# Patient Record
Sex: FEMALE | Race: WHITE | NOT HISPANIC OR LATINO | Employment: FULL TIME | URBAN - METROPOLITAN AREA
[De-identification: names, ages, dates, MRNs, and addresses within clinical notes are randomized per-mention and may not be internally consistent; named-entity substitution may affect disease eponyms.]

---

## 2023-01-04 PROBLEM — F33.41 RECURRENT MAJOR DEPRESSIVE DISORDER, IN PARTIAL REMISSION (HCC): Status: ACTIVE | Noted: 2023-01-04

## 2023-01-04 PROBLEM — G47.00 INSOMNIA: Status: ACTIVE | Noted: 2023-01-04

## 2023-05-31 PROBLEM — M23.8X2 ACL LAXITY, LEFT: Status: ACTIVE | Noted: 2023-05-31

## 2023-06-01 ASSESSMENT — FIBROSIS 4 INDEX: FIB4 SCORE: 0.78

## 2023-06-30 ENCOUNTER — APPOINTMENT (OUTPATIENT)
Dept: ADMISSIONS | Facility: MEDICAL CENTER | Age: 38
End: 2023-06-30
Attending: ORTHOPAEDIC SURGERY
Payer: COMMERCIAL

## 2023-08-15 ENCOUNTER — PRE-ADMISSION TESTING (OUTPATIENT)
Dept: ADMISSIONS | Facility: MEDICAL CENTER | Age: 38
End: 2023-08-15
Attending: ORTHOPAEDIC SURGERY
Payer: COMMERCIAL

## 2023-08-15 VITALS — HEIGHT: 65 IN | BODY MASS INDEX: 52.42 KG/M2

## 2023-08-15 RX ORDER — MULTIVIT WITH MINERALS/LUTEIN
TABLET ORAL DAILY
COMMUNITY

## 2023-08-15 NOTE — PREPROCEDURE INSTRUCTIONS
Pre admit apt: Pt. Instructed to continue regularly prescribed medications through day before surgery.  Instructed to take the following medications, the day of surgery, with a sip of water per anesthesia protocol: sertralilne  METS greater than 4  No issues with anesthesia  Instructed pt to notify Dr. Robles, if she develops any new sxs of covid/illness prior to surgery.

## 2023-08-30 ENCOUNTER — PRE-ADMISSION TESTING (OUTPATIENT)
Dept: ADMISSIONS | Facility: MEDICAL CENTER | Age: 38
End: 2023-08-30
Attending: ORTHOPAEDIC SURGERY
Payer: COMMERCIAL

## 2023-08-30 ENCOUNTER — ANESTHESIA EVENT (OUTPATIENT)
Dept: SURGERY | Facility: MEDICAL CENTER | Age: 38
End: 2023-08-30
Payer: COMMERCIAL

## 2023-08-30 DIAGNOSIS — Z01.812 PRE-OPERATIVE LABORATORY EXAMINATION: ICD-10-CM

## 2023-08-30 LAB — HCG SERPL QL: NEGATIVE

## 2023-08-30 PROCEDURE — 36415 COLL VENOUS BLD VENIPUNCTURE: CPT

## 2023-08-30 PROCEDURE — 84703 CHORIONIC GONADOTROPIN ASSAY: CPT

## 2023-08-31 ENCOUNTER — HOSPITAL ENCOUNTER (OUTPATIENT)
Facility: MEDICAL CENTER | Age: 38
End: 2023-08-31
Attending: ORTHOPAEDIC SURGERY | Admitting: ORTHOPAEDIC SURGERY
Payer: COMMERCIAL

## 2023-08-31 ENCOUNTER — APPOINTMENT (OUTPATIENT)
Dept: RADIOLOGY | Facility: MEDICAL CENTER | Age: 38
End: 2023-08-31
Attending: ORTHOPAEDIC SURGERY
Payer: COMMERCIAL

## 2023-08-31 ENCOUNTER — ANESTHESIA (OUTPATIENT)
Dept: SURGERY | Facility: MEDICAL CENTER | Age: 38
End: 2023-08-31
Payer: COMMERCIAL

## 2023-08-31 VITALS
RESPIRATION RATE: 16 BRPM | HEIGHT: 65 IN | SYSTOLIC BLOOD PRESSURE: 133 MMHG | TEMPERATURE: 97 F | WEIGHT: 293 LBS | DIASTOLIC BLOOD PRESSURE: 71 MMHG | HEART RATE: 91 BPM | OXYGEN SATURATION: 96 % | BODY MASS INDEX: 48.82 KG/M2

## 2023-08-31 PROCEDURE — 700111 HCHG RX REV CODE 636 W/ 250 OVERRIDE (IP): Mod: JZ | Performed by: STUDENT IN AN ORGANIZED HEALTH CARE EDUCATION/TRAINING PROGRAM

## 2023-08-31 PROCEDURE — C1713 ANCHOR/SCREW BN/BN,TIS/BN: HCPCS | Performed by: ORTHOPAEDIC SURGERY

## 2023-08-31 PROCEDURE — 64447 NJX AA&/STRD FEMORAL NRV IMG: CPT | Performed by: ORTHOPAEDIC SURGERY

## 2023-08-31 PROCEDURE — 160029 HCHG SURGERY MINUTES - 1ST 30 MINS LEVEL 4: Performed by: ORTHOPAEDIC SURGERY

## 2023-08-31 PROCEDURE — 700111 HCHG RX REV CODE 636 W/ 250 OVERRIDE (IP): Mod: JZ | Performed by: ORTHOPAEDIC SURGERY

## 2023-08-31 PROCEDURE — 160035 HCHG PACU - 1ST 60 MINS PHASE I: Performed by: ORTHOPAEDIC SURGERY

## 2023-08-31 PROCEDURE — 29888 ARTHRS AID ACL RPR/AGMNTJ: CPT | Mod: LT | Performed by: ORTHOPAEDIC SURGERY

## 2023-08-31 PROCEDURE — 700105 HCHG RX REV CODE 258: Performed by: ORTHOPAEDIC SURGERY

## 2023-08-31 PROCEDURE — 160009 HCHG ANES TIME/MIN: Performed by: ORTHOPAEDIC SURGERY

## 2023-08-31 PROCEDURE — 160041 HCHG SURGERY MINUTES - EA ADDL 1 MIN LEVEL 4: Performed by: ORTHOPAEDIC SURGERY

## 2023-08-31 PROCEDURE — 160048 HCHG OR STATISTICAL LEVEL 1-5: Performed by: ORTHOPAEDIC SURGERY

## 2023-08-31 PROCEDURE — 160025 RECOVERY II MINUTES (STATS): Performed by: ORTHOPAEDIC SURGERY

## 2023-08-31 PROCEDURE — 700102 HCHG RX REV CODE 250 W/ 637 OVERRIDE(OP): Performed by: STUDENT IN AN ORGANIZED HEALTH CARE EDUCATION/TRAINING PROGRAM

## 2023-08-31 PROCEDURE — C1762 CONN TISS, HUMAN(INC FASCIA): HCPCS | Performed by: ORTHOPAEDIC SURGERY

## 2023-08-31 PROCEDURE — 700111 HCHG RX REV CODE 636 W/ 250 OVERRIDE (IP): Performed by: STUDENT IN AN ORGANIZED HEALTH CARE EDUCATION/TRAINING PROGRAM

## 2023-08-31 PROCEDURE — 700111 HCHG RX REV CODE 636 W/ 250 OVERRIDE (IP): Performed by: ORTHOPAEDIC SURGERY

## 2023-08-31 PROCEDURE — 700101 HCHG RX REV CODE 250: Performed by: ORTHOPAEDIC SURGERY

## 2023-08-31 PROCEDURE — 700101 HCHG RX REV CODE 250: Performed by: STUDENT IN AN ORGANIZED HEALTH CARE EDUCATION/TRAINING PROGRAM

## 2023-08-31 PROCEDURE — 160002 HCHG RECOVERY MINUTES (STAT): Performed by: ORTHOPAEDIC SURGERY

## 2023-08-31 PROCEDURE — 29875 ARTHRS KNEE SURG SYNVCT LMTD: CPT | Mod: ASROC | Performed by: PHYSICIAN ASSISTANT

## 2023-08-31 PROCEDURE — 29888 ARTHRS AID ACL RPR/AGMNTJ: CPT | Mod: ASROC,LT | Performed by: PHYSICIAN ASSISTANT

## 2023-08-31 PROCEDURE — 160036 HCHG PACU - EA ADDL 30 MINS PHASE I: Performed by: ORTHOPAEDIC SURGERY

## 2023-08-31 PROCEDURE — A9270 NON-COVERED ITEM OR SERVICE: HCPCS | Performed by: STUDENT IN AN ORGANIZED HEALTH CARE EDUCATION/TRAINING PROGRAM

## 2023-08-31 PROCEDURE — 160046 HCHG PACU - 1ST 60 MINS PHASE II: Performed by: ORTHOPAEDIC SURGERY

## 2023-08-31 PROCEDURE — 29875 ARTHRS KNEE SURG SYNVCT LMTD: CPT | Performed by: ORTHOPAEDIC SURGERY

## 2023-08-31 DEVICE — ANCHOR SUTURE OMEGA PEEK OD3.9 MM 1 ARM KNOTLESS STERILE LATEX FREE DISPOSABLE: Type: IMPLANTABLE DEVICE | Site: KNEE | Status: FUNCTIONAL

## 2023-08-31 DEVICE — PROCINCH RT: Type: IMPLANTABLE DEVICE | Site: KNEE | Status: FUNCTIONAL

## 2023-08-31 DEVICE — SCREW INTERFERENCE BIOSTEON WEDGE 9 X 28CM: Type: IMPLANTABLE DEVICE | Site: KNEE | Status: FUNCTIONAL

## 2023-08-31 DEVICE — GRAFT SOFT TISSUE  PERONEUS LONGUS TENDON 23CM: Type: IMPLANTABLE DEVICE | Site: KNEE | Status: FUNCTIONAL

## 2023-08-31 RX ORDER — DIPHENHYDRAMINE HYDROCHLORIDE 50 MG/ML
12.5 INJECTION INTRAMUSCULAR; INTRAVENOUS
Status: DISCONTINUED | OUTPATIENT
Start: 2023-08-31 | End: 2023-08-31 | Stop reason: HOSPADM

## 2023-08-31 RX ORDER — MAGNESIUM HYDROXIDE 1200 MG/15ML
LIQUID ORAL
Status: COMPLETED | OUTPATIENT
Start: 2023-08-31 | End: 2023-08-31

## 2023-08-31 RX ORDER — ROCURONIUM BROMIDE 10 MG/ML
INJECTION, SOLUTION INTRAVENOUS PRN
Status: DISCONTINUED | OUTPATIENT
Start: 2023-08-31 | End: 2023-08-31 | Stop reason: SURG

## 2023-08-31 RX ORDER — LIDOCAINE HYDROCHLORIDE 20 MG/ML
INJECTION, SOLUTION EPIDURAL; INFILTRATION; INTRACAUDAL; PERINEURAL PRN
Status: DISCONTINUED | OUTPATIENT
Start: 2023-08-31 | End: 2023-08-31 | Stop reason: SURG

## 2023-08-31 RX ORDER — HALOPERIDOL 5 MG/ML
1 INJECTION INTRAMUSCULAR
Status: DISCONTINUED | OUTPATIENT
Start: 2023-08-31 | End: 2023-08-31 | Stop reason: HOSPADM

## 2023-08-31 RX ORDER — SODIUM CHLORIDE, SODIUM LACTATE, POTASSIUM CHLORIDE, CALCIUM CHLORIDE 600; 310; 30; 20 MG/100ML; MG/100ML; MG/100ML; MG/100ML
INJECTION, SOLUTION INTRAVENOUS CONTINUOUS
Status: DISCONTINUED | OUTPATIENT
Start: 2023-08-31 | End: 2023-08-31 | Stop reason: HOSPADM

## 2023-08-31 RX ORDER — HYDROMORPHONE HYDROCHLORIDE 1 MG/ML
0.1 INJECTION, SOLUTION INTRAMUSCULAR; INTRAVENOUS; SUBCUTANEOUS
Status: DISCONTINUED | OUTPATIENT
Start: 2023-08-31 | End: 2023-08-31 | Stop reason: HOSPADM

## 2023-08-31 RX ORDER — MAGNESIUM SULFATE HEPTAHYDRATE 40 MG/ML
INJECTION, SOLUTION INTRAVENOUS PRN
Status: DISCONTINUED | OUTPATIENT
Start: 2023-08-31 | End: 2023-08-31 | Stop reason: SURG

## 2023-08-31 RX ORDER — SCOLOPAMINE TRANSDERMAL SYSTEM 1 MG/1
1 PATCH, EXTENDED RELEASE TRANSDERMAL ONCE
Status: DISCONTINUED | OUTPATIENT
Start: 2023-08-31 | End: 2023-08-31 | Stop reason: HOSPADM

## 2023-08-31 RX ORDER — OXYCODONE HCL 10 MG/1
10 TABLET, FILM COATED, EXTENDED RELEASE ORAL ONCE
Status: COMPLETED | OUTPATIENT
Start: 2023-08-31 | End: 2023-08-31

## 2023-08-31 RX ORDER — EPHEDRINE SULFATE 50 MG/ML
5 INJECTION, SOLUTION INTRAVENOUS
Status: DISCONTINUED | OUTPATIENT
Start: 2023-08-31 | End: 2023-08-31 | Stop reason: HOSPADM

## 2023-08-31 RX ORDER — BUPIVACAINE HYDROCHLORIDE 5 MG/ML
INJECTION, SOLUTION EPIDURAL; INTRACAUDAL
Status: COMPLETED | OUTPATIENT
Start: 2023-08-31 | End: 2023-08-31

## 2023-08-31 RX ORDER — SODIUM CHLORIDE, SODIUM LACTATE, POTASSIUM CHLORIDE, CALCIUM CHLORIDE 600; 310; 30; 20 MG/100ML; MG/100ML; MG/100ML; MG/100ML
INJECTION, SOLUTION INTRAVENOUS CONTINUOUS
Status: ACTIVE | OUTPATIENT
Start: 2023-08-31 | End: 2023-08-31

## 2023-08-31 RX ORDER — MIDAZOLAM HYDROCHLORIDE 1 MG/ML
INJECTION INTRAMUSCULAR; INTRAVENOUS PRN
Status: DISCONTINUED | OUTPATIENT
Start: 2023-08-31 | End: 2023-08-31 | Stop reason: SURG

## 2023-08-31 RX ORDER — ONDANSETRON 2 MG/ML
INJECTION INTRAMUSCULAR; INTRAVENOUS PRN
Status: DISCONTINUED | OUTPATIENT
Start: 2023-08-31 | End: 2023-08-31 | Stop reason: SURG

## 2023-08-31 RX ORDER — LIDOCAINE HYDROCHLORIDE 40 MG/ML
SOLUTION TOPICAL PRN
Status: DISCONTINUED | OUTPATIENT
Start: 2023-08-31 | End: 2023-08-31 | Stop reason: SURG

## 2023-08-31 RX ORDER — HYDROMORPHONE HYDROCHLORIDE 1 MG/ML
0.2 INJECTION, SOLUTION INTRAMUSCULAR; INTRAVENOUS; SUBCUTANEOUS
Status: DISCONTINUED | OUTPATIENT
Start: 2023-08-31 | End: 2023-08-31 | Stop reason: HOSPADM

## 2023-08-31 RX ORDER — ACETAMINOPHEN 500 MG
1000 TABLET ORAL ONCE
Status: COMPLETED | OUTPATIENT
Start: 2023-08-31 | End: 2023-08-31

## 2023-08-31 RX ORDER — ONDANSETRON 2 MG/ML
4 INJECTION INTRAMUSCULAR; INTRAVENOUS
Status: DISCONTINUED | OUTPATIENT
Start: 2023-08-31 | End: 2023-08-31 | Stop reason: HOSPADM

## 2023-08-31 RX ORDER — TRANEXAMIC ACID 100 MG/ML
INJECTION, SOLUTION INTRAVENOUS PRN
Status: DISCONTINUED | OUTPATIENT
Start: 2023-08-31 | End: 2023-08-31 | Stop reason: SURG

## 2023-08-31 RX ORDER — DEXAMETHASONE SODIUM PHOSPHATE 4 MG/ML
INJECTION, SOLUTION INTRA-ARTICULAR; INTRALESIONAL; INTRAMUSCULAR; INTRAVENOUS; SOFT TISSUE PRN
Status: DISCONTINUED | OUTPATIENT
Start: 2023-08-31 | End: 2023-08-31 | Stop reason: SURG

## 2023-08-31 RX ORDER — EPHEDRINE SULFATE 50 MG/ML
INJECTION, SOLUTION INTRAVENOUS PRN
Status: DISCONTINUED | OUTPATIENT
Start: 2023-08-31 | End: 2023-08-31 | Stop reason: SURG

## 2023-08-31 RX ORDER — OXYCODONE HCL 5 MG/5 ML
10 SOLUTION, ORAL ORAL
Status: COMPLETED | OUTPATIENT
Start: 2023-08-31 | End: 2023-08-31

## 2023-08-31 RX ORDER — HYDROMORPHONE HYDROCHLORIDE 1 MG/ML
0.4 INJECTION, SOLUTION INTRAMUSCULAR; INTRAVENOUS; SUBCUTANEOUS
Status: DISCONTINUED | OUTPATIENT
Start: 2023-08-31 | End: 2023-08-31 | Stop reason: HOSPADM

## 2023-08-31 RX ORDER — HYDRALAZINE HYDROCHLORIDE 20 MG/ML
5 INJECTION INTRAMUSCULAR; INTRAVENOUS
Status: DISCONTINUED | OUTPATIENT
Start: 2023-08-31 | End: 2023-08-31 | Stop reason: HOSPADM

## 2023-08-31 RX ORDER — ROPIVACAINE HYDROCHLORIDE 5 MG/ML
INJECTION, SOLUTION EPIDURAL; INFILTRATION; PERINEURAL
Status: DISCONTINUED | OUTPATIENT
Start: 2023-08-31 | End: 2023-08-31 | Stop reason: HOSPADM

## 2023-08-31 RX ORDER — CEFAZOLIN SODIUM 1 G/3ML
3 INJECTION, POWDER, FOR SOLUTION INTRAMUSCULAR; INTRAVENOUS ONCE
Status: COMPLETED | OUTPATIENT
Start: 2023-08-31 | End: 2023-08-31

## 2023-08-31 RX ORDER — OXYCODONE HCL 5 MG/5 ML
5 SOLUTION, ORAL ORAL
Status: COMPLETED | OUTPATIENT
Start: 2023-08-31 | End: 2023-08-31

## 2023-08-31 RX ADMIN — FENTANYL CITRATE 25 MCG: 50 INJECTION, SOLUTION INTRAMUSCULAR; INTRAVENOUS at 11:06

## 2023-08-31 RX ADMIN — FENTANYL CITRATE 50 MCG: 50 INJECTION, SOLUTION INTRAMUSCULAR; INTRAVENOUS at 10:34

## 2023-08-31 RX ADMIN — BUPIVACAINE HYDROCHLORIDE 20 ML: 5 INJECTION, SOLUTION EPIDURAL; INTRACAUDAL at 07:55

## 2023-08-31 RX ADMIN — SCOPOLAMINE 1 PATCH: 1.5 PATCH, EXTENDED RELEASE TRANSDERMAL at 08:14

## 2023-08-31 RX ADMIN — SODIUM CHLORIDE, POTASSIUM CHLORIDE, SODIUM LACTATE AND CALCIUM CHLORIDE: 600; 310; 30; 20 INJECTION, SOLUTION INTRAVENOUS at 07:50

## 2023-08-31 RX ADMIN — EPHEDRINE SULFATE 10 MG: 50 INJECTION, SOLUTION INTRAVENOUS at 09:46

## 2023-08-31 RX ADMIN — FENTANYL CITRATE 100 MCG: 50 INJECTION, SOLUTION INTRAMUSCULAR; INTRAVENOUS at 08:50

## 2023-08-31 RX ADMIN — LIDOCAINE HYDROCHLORIDE 4 ML: 40 SOLUTION TOPICAL at 09:07

## 2023-08-31 RX ADMIN — OXYCODONE HYDROCHLORIDE 5 MG: 5 SOLUTION ORAL at 11:06

## 2023-08-31 RX ADMIN — ONDANSETRON 4 MG: 2 INJECTION INTRAMUSCULAR; INTRAVENOUS at 10:30

## 2023-08-31 RX ADMIN — ROCURONIUM BROMIDE 50 MG: 50 INJECTION, SOLUTION INTRAVENOUS at 09:07

## 2023-08-31 RX ADMIN — ACETAMINOPHEN 1000 MG: 500 TABLET ORAL at 07:49

## 2023-08-31 RX ADMIN — CEFAZOLIN 3 G: 1 INJECTION, POWDER, FOR SOLUTION INTRAMUSCULAR; INTRAVENOUS at 09:06

## 2023-08-31 RX ADMIN — SUGAMMADEX 200 MG: 100 INJECTION, SOLUTION INTRAVENOUS at 10:34

## 2023-08-31 RX ADMIN — MAGNESIUM SULFATE HEPTAHYDRATE 2 G: 2 INJECTION, SOLUTION INTRAVENOUS at 09:24

## 2023-08-31 RX ADMIN — DEXAMETHASONE SODIUM PHOSPHATE 8 MG: 4 INJECTION INTRA-ARTICULAR; INTRALESIONAL; INTRAMUSCULAR; INTRAVENOUS; SOFT TISSUE at 09:10

## 2023-08-31 RX ADMIN — MIDAZOLAM 2 MG: 1 INJECTION, SOLUTION INTRAMUSCULAR; INTRAVENOUS at 07:55

## 2023-08-31 RX ADMIN — LIDOCAINE HYDROCHLORIDE 100 MG: 20 INJECTION, SOLUTION EPIDURAL; INFILTRATION; INTRACAUDAL at 08:50

## 2023-08-31 RX ADMIN — TRANEXAMIC ACID 1000 MG: 100 INJECTION, SOLUTION INTRAVENOUS at 09:36

## 2023-08-31 RX ADMIN — PROPOFOL 200 MG: 10 INJECTION, EMULSION INTRAVENOUS at 09:06

## 2023-08-31 RX ADMIN — OXYCODONE HYDROCHLORIDE 10 MG: 10 TABLET, FILM COATED, EXTENDED RELEASE ORAL at 07:49

## 2023-08-31 ASSESSMENT — PAIN DESCRIPTION - PAIN TYPE
TYPE: SURGICAL PAIN
TYPE: CHRONIC PAIN

## 2023-08-31 ASSESSMENT — FIBROSIS 4 INDEX: FIB4 SCORE: 0.78

## 2023-08-31 ASSESSMENT — PAIN SCALES - GENERAL: PAIN_LEVEL: 3

## 2023-08-31 NOTE — LETTER
June 19, 2023    Patient Name: Moni Elizabeth  Surgeon Name: Neftaly Robles M.D.  Sur[facility] [facility]  Surgery Date: 8/31/2023    The time of your surgery is not final and may change up to and until the day of your surgery. You will be contacted 24-48 hours prior to your surgery date with your check-in and surgery time.    If you will not be at one of the below numbers please call the surgery scheduler at [phone]  Preferred Phone: [phone]    BEFORE YOUR SURGERY   Pre Registration and/or Lab Work must be done within and no earlier than 28 days prior to your surgery date. Please call [facility] at [phone] for an appointment as soon as possible.    Pre op Appointment:   Date: 8/30/2023   Time: 9:00AM   Provider: Neftaly Robles MD   Location: [address] [place] [id number]  Instructions: Bring a list of all medications you are taking including the dosing and frequency.    - Please  your non-narcotic prescriptions prior to surgery at the pharmacy you provided us. DON'T START them until after your surgery.    DAY OF YOUR SURGERY  Nothing to eat or drink after midnight     Refrain from smoking any substance after midnight prior to surgery. Smoking may interfere with the anesthetic and frequently produces nausea during the recovery period.    Continue taking all lifesaving medications. Including the morning of your surgery with small sip of water.    Please do NOT take on the day of surgery:  Diuretics: examples- furosemide (Lasix), spironolactone, hydrochlorothiazide  ACE-inhibitors: examples- lisinopril, ramipril, enalapril  “ARBs”: examples- losartan, Olmesartan, valsartan    Please arrive at the hospital/surgery center at the check-in time provided.     An adult will need to bring you and take you home after your surgery.     AFTER YOUR SURGERY  Post op Appointment:   Date:  9/8/2023   Time: 9:00AM   With: Neftaly Robles MD   Location: [address] [place] [address]    - Therapy- Your first appointment should be 8-10  day(s) after your surgery. For your convenience we have 4 Physical Therapy locations: [place], [facility], [facility], and [facility]. Call our office ASAP to schedule an appointment at [phone] or take the enclosed Therapy Prescription to a facility of your choice.  - You must have someone provide transportation post surgery and someone to monitor you for at least 24 hours post-surgery. If you don't have either of these your appointment will be canceled.     TIME OFF WORK  FMLA or Disability forms can be faxed directly to: [phone] or you may drop them off at [address] [place] [address]. Our office charges a $35.00 fee per form. Forms will be completed within 10 business days of drop off and payment received. For the status of your forms you may contact our disability office directly [phone].    MEDICATION INSTRUCTIONS **Please read section completely**    The following medications should be stopped a minimum of 10 days prior to surgery:  All over the counter, Supplements & Herbal medications    Anorectics: Phentermine (Adipex-P, Lomaira and Suprenza), Phentermine-topiramate (Qsymia), Bupropion-naltrexone (Contrave)    Opiod Partial Agonists/Opioid Antagonists: Buprenorphine (Subocone, Belbuca, Butrans, Probuphine Implant, Sublocade), Naltrexone (ReVia, Vivitrol), Naloxone    Amphetamines: Dextroamphetamine/Amphetamine (Adderall, Mydayis), Methylphenidate Hydrochloride (Concerta, Metadate, Methylin, Ritalin)    The following medications should be stopped 5 days prior to surgery:  Blood Thinners: Any Aspirin, Aspirin products, anti-inflammatories such as ibuprofen and any blood thinners such as Coumadin and Plavix. Please consult your prescribing physician if you are on life saving blood thinners, in regards to when to stop  medications prior to surgery.     The following medications should be stopped a minimum of 3 days prior to surgery:  PDE-5 inhibitors: Sildenafil (Viagra), Tadalafil (Cialis), Vardenafil (Levitra), Avanafil (Stendra)    MAO Inhibitors: Rasagiline (Azilect), Selegiline (Eldepryl, Emsam, Selapar), Isocarboxazid (Marplan), Phenelzine (Nardil)

## 2023-08-31 NOTE — DISCHARGE INSTRUCTIONS
ACTIVITY: Rest and take it easy for the first 24 hours.  A responsible adult is recommended to remain with you during that time.  It is normal to feel sleepy.  We encourage you to not do anything that requires balance, judgment or coordination.    MILD FLU-LIKE SYMPTOMS ARE NORMAL. YOU MAY EXPERIENCE GENERALIZED MUSCLE ACHES, THROAT IRRITATION, HEADACHE AND/OR SOME NAUSEA.    FOR 24 HOURS DO NOT:  Drive, operate machinery or run household appliances.  Drink beer or alcoholic beverages.   Make important decisions or sign legal documents.    DIET: To avoid nausea, slowly advance diet as tolerated, avoiding spicy or greasy foods for the first day.  Add more substantial food to your diet according to your physician's instructions. INCREASE FLUIDS AND FIBER TO AVOID CONSTIPATION.    FOLLOW-UP APPOINTMENT:  A follow-up appointment should be arranged with your doctor; call to schedule.    You should CALL YOUR PHYSICIAN if you develop:  Fever greater than 101 degrees F.  Pain not relieved by medication, or persistent nausea or vomiting.  Excessive bleeding (blood soaking through dressing) or unexpected drainage from the wound.  Extreme redness or swelling around the incision site, drainage of pus or foul smelling drainage.  Inability to urinate or empty your bladder within 8 hours.  Problems with breathing or chest pain.    You should call 911 if you develop problems with breathing or chest pain.  If you are unable to contact your doctor or surgical center, you should go to the nearest emergency room or urgent care center.  Physician's telephone #: [phone]    If any questions arise, call your doctor.  If your doctor is not available, please feel free to call the Surgical Center at [phone].     A registered nurse may call you a few days after your surgery to see how you are doing after your procedure.    MEDICATIONS: Resume taking daily medication.  Take prescribed pain medication with food.  If no medication  is prescribed, you may take non-aspirin pain medication if needed.  PAIN MEDICATION CAN BE VERY CONSTIPATING.  Take a stool softener or laxative such as senokot, pericolace, or milk of magnesia if needed.    Last pain medication given at 11:06 Oxycodone 5mg.    If your physician has prescribed pain medication that includes Acetaminophen (Tylenol), do not take additional Acetaminophen (Tylenol) while taking the prescribed medication.    Peripheral Nerve Block Discharge Instructions from Same Day Surgery and Inpatient :    What to Expect - Lower Extremity  The block may cause you to experience numbness and weakness in your hip and thigh, thigh and knee, or calf and foot on the same side as your surgery  Numbness, tingling and / or weakness are all normal. For some people, this may be an unpleasant sensation  These issues will be resolved when the local anesthetic wears off   You may experience numbness and tingling in your thigh on the same side as your surgery if the block medicine was injected at your groin area  Numbness will make it difficult to walk  You may have problems with balance and walking so be very careful   Follow your surgeon's direction regarding weight bearing on your surgical limb  Be very careful with your numb limb  Precautions  The numbness may affect your balance  Be careful when walking or moving around  Your leg may be weak: be very careful putting weight on it  If your surgeon did not specify a time, you should not bear weight for 24 hours  Be sure to ask for help when you need it  It is better to have help than to fall and hurt yourself  Prevent Injury  Protect the limb like a baby  Beware of exposing your limb to extreme heat or cold or trauma  The limb may be injured without you noticing because it is numb  Keep the limb elevated whenever possible  Do not sleep on the limb  Change the position of the limb regularly  Avoid putting pressure on your surgical limb  Pain Control  The initial  "block on the day of surgery will make your extremity feel \"numb\"  Any consecutive injection including prior to discharge from the hospital will make your extremity feel \"numb\"  You may feel an aching or burning when the local anesthesia starts to wear off  Take pain pills as prescribed by your surgeon  Call your surgeon or anesthesiologist if you do not have adequate pain control    Scopolamine Patches    Okay to remove the patch behind your right ear in 72 hours (Sunday 09/03/2023). Please wash your hands immediately after removing patch and avoid touching your eyes, nose, or mouth.     What is this medication?  SCOPOLAMINE (skoe MARY ANNE a meen) prevents nausea and vomiting. It works by blocking substances in your body that may cause nausea and vomiting. It belongs to a class of medications called antiemetics.  This medicine may be used for other purposes; ask your health care provider or pharmacist if you have questions.  COMMON BRAND NAME(S): Transderm Scop  What should I tell my care team before I take this medication?  They need to know if you have any of these conditions:  Are scheduled to have a gastric secretion test  Glaucoma  Heart disease  Kidney disease  Liver disease  Lung or breathing disease, such as asthma  Mental health condition  Prostate disease  Seizures  Stomach or intestine problems  Trouble passing urine  An unusual or allergic reaction to scopolamine, atropine, other medications, foods, dyes, or preservatives  Pregnant or trying to get pregnant  Breast-feeding  How should I use this medication?  This medication is for external use only. Follow the directions on the prescription label. Wear only 1 patch at a time. Choose an area behind the ear, that is clean, dry, hairless and free from any cuts or irritation. Wipe the area with a clean dry tissue. Peel off the plastic backing of the skin patch, trying not to touch the adhesive side with your hands. Do not cut the patches. Firmly apply to the area " you have chosen, with the metallic side of the patch to the skin and the tan-colored side showing. Once firmly in place, wash your hands well with soap and water. Do not get this medication in your eyes.  After removing the patch, wash your hands and the area behind your ear thoroughly with soap and water. The patch will still contain some medication after use. To avoid accidental contact or ingestion by children or pets, fold the used patch in half with the sticky side together and throw away in the trash out of the reach of children and pets. If you need to use a second patch after you remove the first, place it behind the other ear.  A special MedGuide will be given to you by the pharmacist with each prescription and refill. Be sure to read this information carefully each time.  Talk to your care team about the use of this medication in children. Special care may be needed.  Overdosage: If you think you have taken too much of this medicine contact a poison control center or emergency room at once.  NOTE: This medicine is only for you. Do not share this medicine with others.  What if I miss a dose?  This does not apply. This medication is not for regular use.  What may interact with this medication?  Alcohol  Antihistamines for allergy cough and cold  Atropine  Certain medications for anxiety or sleep  Certain medications for bladder problems, such as oxybutynin, tolterodine  Certain medications for depression, such as amitriptyline, fluoxetine, sertraline  Certain medications for Parkinson disease, such as benztropine, trihexyphenidyl  Certain medications for seizures, such as phenobarbital, primidone  Certain medications for stomach problems, such as dicyclomine, hyoscyamine  General anesthetics, such as halothane, isoflurane, methoxyflurane, propofol  Ipratropium  Local anesthetics, such as lidocaine, pramoxine, tetracaine  Medications that relax muscles for surgery  Opioid medications for pain  Other  belladonna alkaloids  Phenothiazines, such as chlorpromazine, mesoridazine, prochlorperazine, thioridazine  This list may not describe all possible interactions. Give your health care provider a list of all the medicines, herbs, non-prescription drugs, or dietary supplements you use. Also tell them if you smoke, drink alcohol, or use illegal drugs. Some items may interact with your medicine.  What should I watch for while using this medication?  Limit contact with water while swimming and bathing because the patch may fall off. If the patch falls off, throw it away and put a new one behind the other ear.  This medication may affect your coordination, reaction time, or judgment. Do not drive or operate machinery until you know how this medication affects you. Sit up or stand slowly to reduce the risk of dizzy or fainting spells. Drinking alcohol with this medication can increase the risk of these side effects.  Your mouth may get dry. Chewing sugarless gum or sucking hard candy, and drinking plenty of water may help. Contact your care team if the problem does not go away or is severe.  This medication may cause dry eyes and blurred vision. If you wear contact lenses, you may feel some discomfort. Lubricating drops may help. See your care team if the problem does not go away or is severe.  If you are going to need surgery, an MRI, CT scan, or other procedure, tell your care team that you are using this medication. You may need to remove the patch before the procedure.  What side effects may I notice from receiving this medication?  Side effects that you should report to your care team as soon as possible:  Allergic reactions--skin rash, itching, hives, swelling of the face, lips, tongue, or throat  Constipation, bloating, nausea or vomiting, stomach pain, which may be signs of slow movement through the digestive tract  Mood and behavior changes--anxiety, nervousness, confusion, hallucinations, irritability, hostility,  thoughts of suicide or self-harm, worsening mood, feelings of depression  Seizures  Sudden eye pain or change in vision such as blurry vision, seeing halos around lights, vision loss  Trouble passing urine  Side effects that usually do not require medical attention (report to your care team if they continue or are bothersome):  Confusion  Dizziness  Drowsiness  Dry mouth  Sore throat  This list may not describe all possible side effects. Call your doctor for medical advice about side effects. You may report side effects to FDA at 4-842-ZCF-2912.  Where should I keep my medication?  Keep out of the reach of children and pets.  Store at room temperature between 20 and 25 degrees C (68 and 77 degrees F). Keep this medication in the foil package until ready to use.  Get rid of any unused medication after the expiration date.  NOTE: This sheet is a summary. It may not cover all possible information. If you have questions about this medicine, talk to your doctor, pharmacist, or health care provider.  © 2023 [employer]vier/Gold Standard (2022-11-09 00:00:00)

## 2023-08-31 NOTE — ANESTHESIA PROCEDURE NOTES
Arterial Line    Performed by: Esperanza Guillory M.D.  Authorized by: Esperanza Guillory M.D.    Start Time:  8/31/2023 9:02 AM  End Time:  8/31/2023 9:05 AM  Localization: ultrasound guidance  Image captured, interpreted and electronically stored.  Patient Location:  OR  Indication: continuous blood pressure monitoring        Catheter Size:  20 G  Seldinger Technique?: Yes    Laterality:  Left  Site:  Radial artery  Line Secured:  Antimicrobial disc, tape and transparent dressing  Events: patient tolerated procedure well with no complications

## 2023-08-31 NOTE — ANESTHESIA PREPROCEDURE EVALUATION
Case: [id number] Date/Time: 08/31/23 0845    Procedure: LEFT KNEE ARTHROSCOPY, ANTERIOR CRUCIATE LIGAMENT RECONSTRUCTION WITH HAMSTRING AUTOGRAFT, REPAIRS AS INDICATED    Diagnosis: ACL laxity, left [M23.8X2]    Pre-op diagnosis: ACL laxity, left [M23.8X2]    Location:  OR 03 / SURGERY [facility]    Surgeons: Neftaly Robles M.D.        37 yo woman for LEFT knee arthroscopy and ACL reconstruction.   - BMI 55    PONV with prior GA.     NPO>8hrs. No N/V.   METS>4.     No tobacco, EtOH or recreational drugs.     Relevant Problems   Other   (positive) Open injury of spleen       Physical Exam    Airway   Mallampati: II  TM distance: >3 FB  Neck ROM: full       Cardiovascular - normal exam  Rhythm: regular  Rate: normal  (-) murmur     Dental - normal exam           Pulmonary - normal exam  Breath sounds clear to auscultation     Abdominal   (+) obese     Neurological - normal exam         Other findings: No loose teeth             Anesthesia Plan    ASA 3   ASA physical status 3 criteria: morbid obesity - BMI greater than or equal to 40    Plan - general and peripheral nerve block     Peripheral nerve block will be post-op pain control  Airway plan will be ETT          Induction: intravenous    Postoperative Plan: Postoperative administration of opioids is intended.    Pertinent diagnostic labs and testing reviewed    Informed Consent:    Anesthetic plan and risks discussed with patient.    Use of blood products discussed with: patient whom consented to blood products.

## 2023-08-31 NOTE — ANESTHESIA POSTPROCEDURE EVALUATION
Patient: Moni Elizabeth    Procedure Summary     Date: 08/31/23 Room / Location:  OR  / SURGERY [facility]    Anesthesia Start: 0841 Anesthesia Stop: 1050    Procedure: LEFT KNEE ARTHROSCOPY, ANTERIOR CRUCIATE LIGAMENT RECONSTRUCTION WITH HAMSTRING AUTOGRAFT, REPAIRS AS INDICATED (Knee) Diagnosis:       ACL laxity, left      (ACL laxity, left )    Surgeons: Neftaly Robles M.D. Responsible Provider: Esperanza Guillory M.D.    Anesthesia Type: general, peripheral nerve block ASA Status: 3          Final Anesthesia Type: general, peripheral nerve block  Last vitals  BP   Blood Pressure: (!) 153/6    Temp   36.4 °C (97.5 °F)    Pulse   87   Resp   18    SpO2   95 %      Anesthesia Post Evaluation    Patient location during evaluation: PACU  Patient participation: complete - patient participated  Level of consciousness: awake and alert  Pain score: 3    Airway patency: patent  Anesthetic complications: no  Cardiovascular status: hemodynamically stable  Respiratory status: acceptable and face mask  Hydration status: euvolemic    PONV: none          No notable events documented.     Nurse Pain Score: 2 (NPRS)

## 2023-08-31 NOTE — H&P
Surgery Orthopedic History & Physical Note    Date  8/31/2023    Primary Care Physician  TONY Butler      Pre-Op Diagnosis Codes:     * ACL laxity, left [M23.8X2]    HPI  This is a 38 y.o. female who presented with left knee pain and instability.    Past Medical History:   Diagnosis Date    Alcoholism (HCC)     Anorexia nervosa 01/01/2002    inpatient rehab in [place] in 2002    Anxiety     Depression     Psychiatric disorder     depression/anxiety    Spleen laceration        Past Surgical History:   Procedure Laterality Date    TONSILLECTOMY Bilateral 2021       Current Facility-Administered Medications   Medication Dose Route Frequency Provider Last Rate Last Admin    ceFAZolin (Ancef) injection 3 g  3 g Intravenous Once Neftaly Robles M.D.        lidocaine (Xylocaine) 1 % injection 0.5 mL  0.5 mL Intradermal Once PRN Neftaly Robles M.D.        lactated ringers infusion   Intravenous Continuous Neftaly Robles M.D. 10 mL/hr at 08/31/23 0750 New Bag at 08/31/23 0750    scopolamine (Transderm-Scop) patch 1 Patch  1 Patch Transdermal Once Esperanza Guillory M.D.   1 Patch at 08/31/23 0814     Facility-Administered Medications Ordered in Other Encounters   Medication Dose Route Frequency Provider Last Rate Last Admin    midazolam (Versed) injection   Intravenous PRN Espearnza Guillory M.D.   2 mg at 08/31/23 0755       Social History     Socioeconomic History    Marital status:      Spouse name: Not on file    Number of children: Not on file    Years of education: Not on file    Highest education level: Not on file   Occupational History    Not on file   Tobacco Use    Smoking status: Never    Smokeless tobacco: Never   Vaping Use    Vaping Use: Never used   Substance and Sexual Activity    Alcohol use: Not Currently     Comment: sober 6 years    Drug use: Yes     Comment: CBD/THC edibles to sleep, PRN    Sexual activity: Yes     Partners: Male     Birth control/protection: I.U.D.    Other Topics Concern    Not on file   Social History Narrative    Not on file     Social Determinants of Health     Financial Resource Strain: Not on file   Food Insecurity: Not on file   Transportation Needs: Not on file   Physical Activity: Not on file   Stress: Not on file   Social Connections: Not on file   Intimate Partner Violence: Not on file   Housing Stability: Not on file       Family History   Problem Relation Age of Onset    Hypertension Mother     Hyperlipidemia Mother     Alcohol abuse Father     Heart Disease Father     Alcohol abuse Paternal Grandfather        Allergies  Sulfa drugs and Tape    Review of Systems  Negative    Physical Exam  No deformity.  Small effusion.  Range of motion 0 to 120 degrees.  Patient can perform a straight leg raise without an extensor lag.  Some tenderness over the medial and lateral joint lines.  Discomfort but no clicking with Yossi's test.  Laxity with Lachman test.  Negative posterior drawer.  No obvious pain or instability with varus and valgus at 0 and 30 degrees.  Lower leg is soft and nontender.  Neurovascular intact.    Vital Signs  Blood Pressure: (!) 153/6   Temperature: 36.4 °C (97.5 °F)   Pulse: 87   Respiration: 18   Pulse Oximetry: 95 %       Labs:                    Radiology:  No orders to display         Assessment/Plan:  Pre-Op Diagnosis Codes:     * ACL laxity, left [M23.8X2]  Procedure(s):  LEFT KNEE ARTHROSCOPY, ANTERIOR CRUCIATE LIGAMENT RECONSTRUCTION WITH HAMSTRING AUTOGRAFT, REPAIRS AS INDICATED

## 2023-08-31 NOTE — DISCHARGE INSTR - OTHER INFO
Dr. Robles Discharge Instructions  (Knee)    Contact Info: If you have any questions or concerns, please contact [facility] at [phone] during normal business hours (Monday-Friday: 8:00am-5:00pm) or the main office number at [phone] after normal business hours.     Diet: Resume your regular diet slowly and as tolerated.      Icing/Elevating: Apply ice to the operative knee near full time for the first 5 days following surgery.  Be sure to have a surgical dressing or towel between the ice and skin.  After 5 days, you should apply ice for only 10 minutes, 2-3 times per day.  Elevate the operative extremity above the level of your heart (“toes above the nose”), placing pillows or blankets underneath the lower leg/calf or ankle so the knee remains straight/fully extended.  DO NOT place pillows or blankets underneath the operative KNEE.  Placing pillows or blankets underneath the operative knee will cause your knee to be bent/flexed for long periods of time, making it difficult to get the knee straight/fully extended after surgery.    Medications: Resume your home medications as normal, unless otherwise instructed.    Narcotic pain medication (Percocet, Norco, Oxycodone, etc.): Take the narcotic pain medication as needed for pain, as instructed on the medication bottle.  DO NOT drive, drink alcohol, or sign important documents while taking narcotic pain medications.    Anti-nausea medication (Zofran/Ondansetron, Phenergan, etc.): Take the anti-nausea medication if you are feeling ill or nauseated after surgery.    Blood clot (DVT) prophylaxis medication (Aspirin): Take an enteric-coated baby Aspirin (81 mg) twice daily for 2 weeks following surgery to prevent blood clots (deep venous thrombosis/DVT).  If you develop chest pain, shortness of breath, and/or leg pain, swelling, or redness, you should contact Dr. Robles's office immediately.    You should take Prilosec (40 mg daily) OR Pepcid (20 mg  twice daily) when taking the enteric-coated Aspirin to minimize stomach/gastrointestinal irritation (both of these medications can be purchased over-the-counter).      Regional Nerve Blocks: You may have a regional nerve block that was performed by the Anesthesia team prior to surgery to help with postoperative pain control.  The nerve block usually lasts 12-18 hours, but may last up to 24 hours.  You will know the nerve block is wearing off when you begin to have increasing discomfort or soreness around the knee.  Be sure to start taking the prescribed narcotic pain medication within a few hours after surgery and/or as soon as you feel soreness around the operative knee.      Compression Stockings: You may wake up from surgery with compression stockings placed on the operative lower extremity only or on both lower extremities.  These provide compression around the thigh and leg to prevent blood clots and help improve circulation following surgery.  The compression stockings can be removed with the surgical dressing 3 days after surgery, but should then be reapplied.  The compression stockings should be worn while on crutches, while sedentary, or for approximately 2 weeks following surgery depending on restrictions.      Showering/Dressing: You may remove the surgical dressing and compression stocking 3 DAYS AFTER SURGERY.   You will see Steri-Strips (white stickers) covering the sutures and incisions - these will stay on until your first postoperative appointment.  Once the surgical dressing has been removed, you may shower as normal.  Let water run freely over the incisions and then pat the knee dry with a clean towel.  You may then leave the knee open to the air, or you may cover the knee with a simple fresh dry dressing or ACE wrap.  DO NOT scrub the incisions.  DO NOT apply soap, ointments, lotions, or Bacitracin on the incisions for at least 6 weeks after surgery.  DO NOT submerge the operative knee in a bath,  pool, river, hot tub, lake, etc. for at least 6 weeks after surgery.      Driving: You may resume driving when you feel comfortable and safe doing so and when you are off all narcotic pain medications.  If you had a LEFT knee surgery, you will not be able to operate a manual transmission until you are allowed to fully weightbear without assistance.        SPECIAL INSTRUCTIONS    Ligament Reconstruction (ACL)  Brace: A hinged knee brace will be worn for a total of 2-4 weeks following surgery, depending on your muscle strength and clearance by Dr. Robles or your physical therapist.  Activity: You will remain toe-touch weightbearing on the operative extremity with the use of crutches/walker and the brace in place (locked straight/full extension) for 2-4 weeks following surgery, depending on your muscle strength and clearance by Dr. Robles or your physical therapist.  After this time, you may return to normal light activities of daily living as tolerated.    Recovery: Return to unrestricted activities is expected at 9-12 months.  Follow-Up: You will be seen back in the clinic for your first postoperative appointment approximately 7-10 days after surgery.  Your sutures will be removed at this time and the Steri-Strips replaced.    Physical Therapy: Physical therapy will be started approximately 7-10 days after surgery, unless instructed otherwise.  This appointment should already be scheduled (appointment time and date will be in your surgery letter/packet if scheduled at [facility], or the physical therapy prescription will be mailed/e-mailed to you if going to an outside physical therapy practice).      PROBLEMS  Reasons to contact Dr. Robles's office immediately include:  Fevers over 101.3°F (38.5°C) consistently, chills, sweats   Increased drainage from or swelling around the incisions  Excessive bleeding (dressing is saturated)  Excessive redness around incisions  Discomfort and/or swelling in the lower leg and  calf  Chest pain and/or shortness of breath  Pain not controlled by pain medication  Swelling that is accompanied by coolness or decreased sensation in the knee, leg, ankle, or foot  Persistent nausea or vomiting

## 2023-08-31 NOTE — ANESTHESIA TIME REPORT
Anesthesia Start and Stop Event Times     Date Time Event    8/31/2023 0750 Ready for Procedure     0841 Anesthesia Start     1050 Anesthesia Stop        Responsible Staff  08/31/23    Name Role Begin End    Esperanza Guillory M.D. Anesth 0841 1050        Overtime Reason:  no overtime (within assigned shift)    Comments:

## 2023-08-31 NOTE — OR NURSING
Pt arrive to stage 2 via gurney. Dressed and up to chair. Denies pain and nausea. Stable on RA. Family present.    DC education provided to pt and pt family, both verbalized understanding. Pt able to void prior to DC home. Able to ambulate to BR with crutches. D/Arnaud to care of family post uneventful stay in PACU 2. Assisted out to car in .

## 2023-08-31 NOTE — OP REPORT
DATE OF SERVICE: 8/31/2023    SURGEON:  Neftaly Robles MD     ASSISTANT:  Serenity Carlos PA-C      ANESTHESIOLOGIST: Esperanza Guillory MD     ANESTHESIA:  General anesthesia with single shot adductor canal nerve block     PREOPERATIVE DIAGNOSIS:    1.  Left knee anterior cruciate ligament tear     POSTOPERATIVE DIAGNOSES:    1.  Left knee anterior cruciate ligament tear  2.  Left knee synovitis     PROCEDURES PERFORMED:  1.  Left knee arthroscopy  2.  Left knee limited synovectomy  3.  Left knee anterior cruciate ligament reconstruction with hamstring autograft and soft tissue allograft augmentation     IMPLANTS:    Swiss ProCinch adjustable loop button  Stan 9 mm x 28 mm Biosteon interference screw  Styker 3.9 mm Omega anchor     INFORMED CONSENT:  The patient was informed of the risks, benefits and alternatives of the planned operation.  The risks include but are not limited to bleeding, infection, neurovascular damage, recurrent ligament tear, , osteoarthritis/cartilage damage, pain, stiffness, instability DVT, PE, MI, stroke, and death.  Advanced directives were reviewed.  After answering all questions, the patient elected to proceed with the planned operation and an informed consent was signed.     PROCEDURE:  The patient was identified in the preoperative holding area.  The correct procedural side and site were identified and marked.  The patient was then brought to the operating room and transferred to the operating room table.  The patient underwent a general anesthesia.    Examination of the knee demonstrated no overlying skin lesions, abrasions, or lacerations.  There was no effusion.  Range of motion was from 0-135.  There was a positive Lachman test without an endpoint as well as a positive pivot shift.  Negative posterior drawer.  No instability with varus and valgus stress at 0 an 30 degrees.    The knee was cleaned with several alcohol-soaked gauzes and the joint injected with 30 mL of 1%  lidocaine with epinephrine.  A tourniquet was applied to the upper thigh.  The lower extremity was then prepped and draped in the normal standard sterile fashion.       A procedural pause was performed with the operating room team.  The procedure, patient's identity, operative side, surgical site, and procedure to be performed were all verified.  The patient was given IV antibiotics prior to incision.    I began with a diagnostic arthroscopy via standard anteromedial and anterolateral portals.  Examination of the patellofemoral compartment demonstrated some grade 1 softening on the undersurface of the patella as well as within the trochlear groove.  No obvious loose bodies in the medial and lateral gutters.  Examination of the notch demonstrated inflammation and synovitis of the infrapatellar fat pad.  The was obvious disruption of the ACL.  The PCL appeared to be intact.  Examination of the medial compartment demonstrated no obvious tears of the medial meniscus and well-preserved cartilage on the medial femoral condyle and medial tibial plateau.  Examination of the lateral compartment demonstrated no obvious tears of the lateral meniscus.  There was well-preserved cartilage on the lateral femoral condyle and some intermittent grade 2 changes on the lateral tibial plateau.    All instruments were removed from the joint.  An approximately 3 cm longitudinal incision was made over the anteromedial proximal tibia.  Dissection was carried down and the underlying sartorial fascia was incised.  The gracilis and semitendinosis tendons were identified and freed from the overlying adhesions and then harvested with a tendon stripper in the standard fashion.  The graft was then brought to the back table.  The graft measured about 6.5 mm doubled over and 7.5 mm quadrupled over.  As a result, I elected to augment her hamstring tissue with a soft tissue allograft.  The final graft was then prepared with the ProCinc adjustable  loop button and measured 9 mm x 83 mm.    I then placed the camera back into the joint.  I first performed a synovectomy of the infrapatellar fat pad.  I then turned my attention back to the notch.  The remaining ACL fibers were debrided.  A small notchplasty was performed to remove any potential impinging bone or cartilage.  The knee was flexed to 135 degrees and a 7 mm offset guide placed through the medial portal and up against the remaining fibers of the femoral ACL footprint.  The guide pin was then drilled through the lateral femoral condyle and pushed through the skin of the lateral thigh.  We then drilled the length of the condyle with a 4.5 mm reamer and the length of the condyle was noted.  We carefully inserted the 9 mm low profile acorn reamer into the joint and a tunnel of approximately 20 mm in length was created in the distal femur.  The sutures were passed in the standard fashion.  With the camera in the medial portal, I noted excellent position of the tunnel and no evidence of posterior wall blowout.    The knee was then relaxed to 90 degrees.  The ACL tibial guide was placed directly over the ACL tibial footprint and the metal guide placed on the bone of the proximal tibia through the previously made incision.  The guide pin was drilled into the center of the ACL tibial footprint, and the guide removed.  The 9 mm reamer was then used to create a tunnel within the proximal tibia and the sutures were passed in the standard fashion.  The autograft and button were then passed into the joint in a retrograde fashion and the button docked on the lateral femoral cortex.  Its position was confirmed with fluoroscopy.  The sutures were pulled in an alternating fashion and the graft docked in the femoral tunnel.  The knee was flexed and extended 30 times to appropriately tension the graft.  The knee was then brought into approximately 5 degrees short of full extension and, with a gentle posterior drawer  applied, the tibial side of the graft was secured with a 9 mm x 28 mm Biosteon interference screw.  The remaining tibial sutures were then threaded through a Stan 3.9 mm Omega anchor and this was placed 1.5 cm distal to the tibial tunnel for backup fixation.  At this point in time, there was a negative Lachman test with a definite endpoint.  Under direct arthroscopic visualization, the graft appeared to be tight and there was no evidence of notch or roof impingement through a full range of motion.      I then scanned the entire knee joint again including modified Gillquist maneuvers to confirm there were no remaining loose bodies or soft tissue debris.  Meticulous hemostasis was obtained.  All instruments were removed.      All incisions were copiously irrigated.  The sartorial fascia was repaired and the proximal tibial incision was closed in a layered fashion with Monocryl.  The portal incisions were closed with simple 3-0 Prolene suture.  Steri-Strips and Xeroform were applied over all incisions.  The knee was injected with 30 mL of 0.5% ropivacaine.  A sterile compressive dressing was applied followed by a AZAR hose stocking and a hinged knee brace locked in full extension.      Needle and sponge counts were correct at the end of the procedure as reported to the surgeon by the circulating nurse.  The patient tolerated the procedure well with no obvious intraoperative complications.  The patient was then transferred off the operating room table on to a regular hospital bed.  The patient was extubated by the anesthesia team.      ESTIMATED BLOOD LOSS:  10 mL     COMPLICATIONS:  None     TOURNIQUET TIME:  None     SPECIMENS:  None     WOUND TYPE:  Type 1 clean     POSTOPERATIVE PLAN:  The patient will be transferred back to the postoperative unit.  I expect the patient will be discharged from the hospital later today once mobilizing safely and tolerating oral medications.  The patient will be touch-down  weightbearing on the lower extremity with crutches and the brace in place.  We will begin physical therapy in the next 7-10 days.  The patient will take Aspirin for pharmacological DVT prophylaxis.      A locking adjustable hinged knee brace was provided following the above surgery to stabilize varus-valgus forces, provide rotational control, and limit knee range-of-motion. A delay in providing this brace would place the patient at risk of reinjury.  Crutches or walker were provided to assist with ambulation following the above surgery. The patient understands the importance of using the crutches or walker to safely ambulate until they regain strength and stability.

## 2023-08-31 NOTE — ANESTHESIA PROCEDURE NOTES
Airway    Date/Time: 8/31/2023 9:07 AM    Performed by: Esperanza Guillory M.D.  Authorized by: Esperanza Guillory M.D.    Location:  OR  Urgency:  Elective  Indications for Airway Management:  Anesthesia      Spontaneous Ventilation: absent    Sedation Level:  Deep  Preoxygenated: Yes    Patient Position:  Sniffing  Mask Difficulty Assessment:  1 - vent by mask  Final Airway Type:  Endotracheal airway  Final Endotracheal Airway:  ETT  Cuffed: Yes    Technique Used for Successful ETT Placement:  Direct laryngoscopy    Insertion Site:  Oral  Blade Type:  Domingo  Laryngoscope Blade/Videolaryngoscope Blade Size:  3  ETT Size (mm):  7.5  Measured from:  Teeth  ETT to Teeth (cm):  22  Placement Verified by: auscultation and capnometry    Cormack-Lehane Classification:  Grade IIa - partial view of glottis  Number of Attempts at Approach:  1

## 2023-08-31 NOTE — OR NURSING
1044: Patient arrived from OR via gurney.  Surgical dressing on left knee CDI immobilizer in place; pedal pulse +2. Cold pack in place no complaints of pain or nausea at this time.    Sedation/Resp Status: Responsive with eye opening to verbal.  Respirations spontaneous and non-labored.    HR 86SR; VSS on 6L 02 via simple mask. ART line in place, zeroed, and calibrated for invasive BP monitoring.    1059: Surgical dressing CDI, no complaints of nausea at this time, vital signs are stable. Complains of 5 out of 10 pain, will medicate per MAR order.     1106: Medicated per MAR order for 5 out of 10 pain.     1114: Surgical dressing CDI, no complaints of nausea at this time, vital signs are stable. Complains of 4 out of 10 tolerable pain.    1136: Med hold complete. Patient meets criteria for transfer to stage II.     1144: Surgical dressing CDI, no complaints of nausea at this time, vital signs are stable. Complains of 3 out of 10 tolerable pain. ART discontinued per order, tip in tact, pressure applied for 10 minutes. Dry gauze, tegaderm, and coban applied to ART line site.     1146: Report called to Cathleen WILSON in stage II.     1152: Patient transferred to stage II by CNA.

## 2023-08-31 NOTE — ANESTHESIA PROCEDURE NOTES
Peripheral Block    Date/Time: 8/31/2023 7:55 AM    Performed by: Esperanza Guillory M.D.  Authorized by: Esperanza Guillory M.D.    Patient Location:  Pre-op  Start Time:  8/31/2023 7:55 AM  End Time:  8/31/2023 8:00 AM  Reason for Block: at surgeon's request and post-op pain management ONLY    patient identified, IV checked, site marked, risks and benefits discussed, surgical consent, monitors and equipment checked, pre-op evaluation and timeout performed    Patient Position:  Supine  Prep: ChloraPrep    Monitoring:  Heart rate, continuous pulse ox and cardiac monitor  Block Region:  Lower Extremity  Lower Extremity - Block Type:  Selective FEMORAL nerve block at the Adductor Canal    Laterality:  Left  Procedures: ultrasound guided  Image captured, interpreted and electronically stored.  Local Infiltration:  Lidocaine  Strength:  1 %  Dose:  3 ml  Block Type:  Single-shot  Needle Length:  100mm  Needle Gauge:  21 G  Needle Localization:  Ultrasound guidance  Injection Assessment:  Negative aspiration for heme, no paresthesia on injection, incremental injection and local visualized surrounding nerve on ultrasound  Evidence of intravascular injection: No     US Guided Selective Femoral Nerve Block at Adductor Canal:   US probe placed at mid-thigh level on externally rotated leg and femur identified.  Probe directed medially until Sartorius Muscle (SM), Femoral Artery (FA) and Saphenous Nerve (SN) identified in Adductor Canal (AC).  Needle inserted anterolateral to probe in an in plane approach into a subsartorial perivascular perineural position.  After negative aspiration LA injected with ease and visualized spreading within the AC.

## (undated) DEVICE — GLOVE BIOGEL SZ 8 SURGICAL PF LTX - (50PR/BX 4BX/CA)

## (undated) DEVICE — DRAPE LOWER EXTREMETY - (6/CA)

## (undated) DEVICE — COVER LIGHT HANDLE FLEXIBLE - SOFT (2EA/PK 80PK/CA)

## (undated) DEVICE — GLOVE BIOGEL INDICATOR SZ 8 SURGICAL PF LTX - (50/BX 4BX/CA)

## (undated) DEVICE — GLOVE BIOGEL SZ 7 SURGICAL PF LTX - (50PR/BX 4BX/CA)

## (undated) DEVICE — SODIUM CHL. IRRIGATION 0.9% 3000ML (4EA/CA 65CA/PF)

## (undated) DEVICE — TOWEL STOP TIMEOUT SAFETY FLAG (40EA/CA)

## (undated) DEVICE — ABLATOR WAND SERFAS 90-S CRUISE

## (undated) DEVICE — SUTURE 3-0 PROLENE PS-1 (12PK/BX)

## (undated) DEVICE — DRAPE IOBAN II INCISE 23X17 - (10EA/BX 4BX/CA)

## (undated) DEVICE — SUTURE 2-0 MONOCRYL PLUS UNDYED CT-1 1 X 36 (36EA/BX)"

## (undated) DEVICE — SUTURE GENERAL

## (undated) DEVICE — BLADE SURGICAL #15 - (50/BX 3BX/CA)

## (undated) DEVICE — DRAPE LARGE 3 QUARTER - (20/CA)

## (undated) DEVICE — SUTURE 2-0 VICRYL PLUS CT-2 - 27 INCH (36/BX)

## (undated) DEVICE — BLADE SHAVER AGGRESSIVE PLUS 4.0MM ANGLED (5EA/BX)

## (undated) DEVICE — CLOSURE SKIN STRIP 1/2 X 4 IN - (STERI STRIP) (50/BX 4BX/CA)

## (undated) DEVICE — GLOVE BIOGEL PI INDICATOR SZ 7.0 SURGICAL PF LF - (50/BX 4BX/CA)

## (undated) DEVICE — PACK MINOR BASIN - (2EA/CA)

## (undated) DEVICE — BANDAGE ELASTIC STERILE VELCRO 6 X 5 YDS (25EA/CA)

## (undated) DEVICE — NEEDLE SPINAL NON-SAFETY 18 GA X 3 IN (25EA/BX)

## (undated) DEVICE — DRAPE SURGICAL U 77X120 - (10/CA)

## (undated) DEVICE — SENSOR OXIMETER ADULT SPO2 RD SET (20EA/BX)

## (undated) DEVICE — SHAVER 5.5 RESECTOR FORMULA (5EA/BX )

## (undated) DEVICE — PACK KNEE ARTHROSCOPY SM OR - (2EA/CA)

## (undated) DEVICE — CHLORAPREP 26 ML APPLICATOR - ORANGE TINT(25/CA)

## (undated) DEVICE — PADDING CAST 6 IN STERILE - 6 X 4 YDS (24/CA)

## (undated) DEVICE — TUBING CASSETTE CROSSFLOW INTEGRATED (10EA/CA)

## (undated) DEVICE — TUBING DAY USE W/CARTRIDGE (10EA/BX)